# Patient Record
Sex: MALE | Race: BLACK OR AFRICAN AMERICAN | Employment: FULL TIME | URBAN - METROPOLITAN AREA
[De-identification: names, ages, dates, MRNs, and addresses within clinical notes are randomized per-mention and may not be internally consistent; named-entity substitution may affect disease eponyms.]

---

## 2019-03-09 ENCOUNTER — HOSPITAL ENCOUNTER (EMERGENCY)
Age: 59
Discharge: HOME | End: 2019-03-09
Attending: EMERGENCY MEDICINE | Admitting: EMERGENCY MEDICINE
Payer: COMMERCIAL

## 2019-03-09 VITALS
HEART RATE: 71 BPM | WEIGHT: 150.8 LBS | DIASTOLIC BLOOD PRESSURE: 76 MMHG | SYSTOLIC BLOOD PRESSURE: 142 MMHG | RESPIRATION RATE: 16 BRPM | TEMPERATURE: 98.4 F | OXYGEN SATURATION: 100 %

## 2019-03-09 DIAGNOSIS — L98.9 SKIN LESION: Primary | ICD-10-CM

## 2019-03-09 PROCEDURE — 99282 EMERGENCY DEPT VISIT SF MDM: CPT

## 2019-03-09 NOTE — ED PROVIDER NOTES
EMERGENCY DEPARTMENT HISTORY AND PHYSICAL EXAM    2:23 PM      Date: 3/9/2019  Patient Name: Jose Workman    History of Presenting Illness     Chief Complaint   Patient presents with    Leg Pain       History Provided By: Patient    Chief Complaint: Skin problem  Duration:  A year  Timing:  Constant  Location: Left leg, posterior portion of calf  Quality: Itchy  Severity: Not obtained at this time   Modifying Factors: when he squeezes it, it gets hard  Associated Symptoms: denies any other associated signs or symptoms      Additional History (Context): Jose Workman is a 66 y.o. male with no pertinent medical history of  who presents to the emergency department for evaluation of a constant, itchy left posterior calf skin problem onset a year ago. The pt indicated that when he \"squeezes it, it gets hard\". No calf swelling, erythema, or calf pain. Pt has not tried anything for his symptoms. Pt denies any fevers or chills, headache, dizziness or light headedness, ENT issues, CP or discomfort, SOB, cough, n/v/d/c, abd pain, back pain, diaphoresis, melena/hematochezia, dysuria, hematuria, frequency, focal weakness/numbness/tingling, or rash. Patient has no other complaints at this time. PCP:  None            Past History     Past Medical History:  No past medical history on file. Past Surgical History:  No past surgical history on file. Family History:  No family history on file. Social History:  Social History     Tobacco Use    Smoking status: Not on file   Substance Use Topics    Alcohol use: Not on file    Drug use: Not on file       Allergies:  No Known Allergies      Review of Systems     Review of Systems   Constitutional: Negative for chills and fever. HENT: Negative for congestion, rhinorrhea and sore throat. Eyes: Negative for visual disturbance. Respiratory: Negative for cough and shortness of breath. Cardiovascular: Negative for chest pain and palpitations. Gastrointestinal: Negative for abdominal pain, nausea and vomiting. Musculoskeletal: Negative for back pain and myalgias. Skin: Negative. Skin problem on the left posterior calf    Allergic/Immunologic: Negative. Neurological: Negative for headaches. All other systems reviewed and are negative. Physical Exam     Visit Vitals  /76 (BP 1 Location: Left arm, BP Patient Position: At rest)   Pulse 71   Temp 98.4 °F (36.9 °C)   Resp 16   Wt 68.4 kg (150 lb 12.8 oz)   SpO2 100%     Physical Exam   Constitutional: He is oriented to person, place, and time. He appears well-developed and well-nourished. HENT:   Head: Normocephalic and atraumatic. Eyes: Conjunctivae are normal.   Neck: Neck supple. Cardiovascular: Normal rate, regular rhythm and normal heart sounds. Exam reveals no gallop and no friction rub. No murmur heard. Pulmonary/Chest: Effort normal and breath sounds normal. No respiratory distress. He has no wheezes. He has no rales. Neurological: He is alert and oriented to person, place, and time. He exhibits normal muscle tone. Skin: Skin is warm and dry. No rash noted. No erythema. No pallor. Left posterior superior lower leg with 1x1.5cm hyperpigmented plaque with rubbery subcutaneous involvement, nontender on examination. Psychiatric: He has a normal mood and affect. His behavior is normal. Judgment and thought content normal.   Nursing note and vitals reviewed. Diagnostic Study Results     Labs -  No results found for this or any previous visit (from the past 12 hour(s)). Radiologic Studies -   No results found. Medical Decision Making   I am the first provider for this patient. I reviewed the vital signs, available nursing notes, past medical history, past surgical history, family history and social history. Vital Signs-Reviewed the patient's vital signs.     Pulse Oximetry Analysis -  100% on room air (Interpretation) WNL     Cardiac Monitor:  Rate: 71 bpm     Records Reviewed: Old Medical Records (Time of Review: 2:23 PM)    ED Course: Progress Notes, Reevaluation, and Consults:      Provider Notes (Medical Decision Making): Pt was concerned it was a problem with his vein. This is not a DVT. Instructions for him to f/u with PCP AND dermatology as he may need a biopsy. Pt declined any topical steroids for itching and will f/u as directed. Diagnosis     Clinical Impression:   1. Skin lesion        Disposition: D/C    Follow-up Information     Follow up With Specialties Details Why Contact Info    Real Anthony MD Emergency Medicine In 3 days  [address] [place] [address] [phone]      [facility]  In 3 days  [address] [id number] [phone]    [facility] EMERGENCY DEPT Emergency Medicine  If symptoms worsen [id number]  [phone]              Medication List      You have not been prescribed any medications. Scribe Attestation     Raffy Martinez acting as a scribe for and in the presence of Greta Nieto PA-C      March 09, 2019 at 2:24 PM       Provider Attestation:      I personally performed the services described in the documentation, reviewed the documentation, as recorded by the scribe in my presence, and it accurately and completely records my words and actions.  March 09, 2019 at 2:24 PM - Greta Nieto PA-C    _______________________________    SONDRA León

## 2019-03-09 NOTE — ED TRIAGE NOTES
\"I have a knot on the back of my leg that is hurting.   Its been there for a year but now it looks like its spreading\"

## 2021-04-15 ENCOUNTER — HOSPITAL ENCOUNTER (EMERGENCY)
Age: 61
Discharge: HOME | End: 2021-04-16
Attending: EMERGENCY MEDICINE
Payer: COMMERCIAL

## 2021-04-15 VITALS
SYSTOLIC BLOOD PRESSURE: 140 MMHG | HEART RATE: 65 BPM | DIASTOLIC BLOOD PRESSURE: 81 MMHG | OXYGEN SATURATION: 100 % | HEIGHT: 69 IN | RESPIRATION RATE: 18 BRPM | TEMPERATURE: 97.9 F | WEIGHT: 150 LBS | BODY MASS INDEX: 22.22 KG/M2

## 2021-04-15 DIAGNOSIS — H61.23 BILATERAL IMPACTED CERUMEN: Primary | ICD-10-CM

## 2021-04-15 PROCEDURE — 99281 EMR DPT VST MAYX REQ PHY/QHP: CPT

## 2021-04-16 NOTE — DISCHARGE INSTRUCTIONS
[employer] Activation    Thank you for requesting access to [employer]. Please follow the instructions below to securely access and download your online medical record. [employer] allows you to send messages to your doctor, view your test results, renew your prescriptions, schedule appointments, and more. How Do I Sign Up? In your internet browser, go to [email or web address]  Click on the First Time User? Click Here link in the Sign In box. You will be redirect to the New Member Sign Up page. Enter your [employer] Access Code exactly as it appears below. You will not need to use this code after youve completed the sign-up process. If you do not sign up before the expiration date, you must request a new code. [employer] Access Code: [unfilled] (This is the date your [employer] access code will )    Enter the last four digits of your Social Security Number (xxxx) and Date of Birth (mm/dd/yyyy) as indicated and click Submit. You will be taken to the next sign-up page. Create a [employer] ID. This will be your [employer] login ID and cannot be changed, so think of one that is secure and easy to remember. Create a [employer] password. You can change your password at any time. Enter your Password Reset Question and Answer. This can be used at a later time if you forget your password. Enter your e-mail address. You will receive e-mail notification when new information is available in [address] Click Sign Up. You can now view and download portions of your medical record. Click the [place] link to download a portable copy of your medical information. Additional Information    If you have questions, please visit the Frequently Asked Questions section of the [employer] website at [email or web address] Remember, [employer] is NOT to be used for urgent needs. For medical emergencies, dial 911.

## 2021-04-16 NOTE — ED TRIAGE NOTES
Pt states that he feels like something is moving around in his ears, left worse than right. Pain and swelling feeling. Denies any drainage. Decreased hearing to left ear.  Denies trauma

## 2021-04-16 NOTE — ED PROVIDER NOTES
EMERGENCY DEPARTMENT HISTORY AND PHYSICAL EXAM    Date: 4/15/2021  Patient Name: Genesis Lynn    History of Presenting Illness     Chief Complaint   Patient presents with    Ear Pain         History Provided By: Patient    Chief Complaint: Left Ear Pain  Duration: 2 Weeks  Timing:  Constant and Worsening  Location: Inner Left Ear  Quality: Fullness, Feels like something is moving  Severity: 10 out of 10  Modifying Factors: None  Associated Symptoms: denies any other associated signs or symptoms      Additional History (Context): Genesis Lynn is a 64 y.o. male who presents with left, inner ear pain that has been going on for about two weeks. The patient said the pain started as mild and has gradually increased. The pain is constant, he also described a fullness feeling and like there was something moving inside his ear. The patient denied any Q-tip trauma or any kind of trauma to his ear or any insects being in his ear to his knowledge. The patient said there was no radiation of the pain to his head, face, neck, or anywhere else. He rated the pain a 10 on the pain scale. The patient had not tried any home remedies to clean his ear. No other complaints reported. PCP: Unknown, Provider        Past History     Past Medical History:  No past medical history on file. Past Surgical History:  No past surgical history on file. Family History:  No family history on file. Social History:  Social History     Tobacco Use    Smoking status: Not on file   Substance Use Topics    Alcohol use: Not on file    Drug use: Not on file       Allergies:  No Known Allergies      Review of Systems   Review of Systems   Constitutional: Negative for chills, fatigue and fever. HENT: Positive for ear pain (Left Side - pre auricular and inner ear). Negative for ear discharge, hearing loss, sinus pressure, sinus pain, sore throat, tinnitus and trouble swallowing. Facial swelling: Left side, pre-auricular. Eyes: Negative for visual disturbance. Respiratory: Negative for shortness of breath. Cardiovascular: Negative for chest pain. Gastrointestinal: Negative for abdominal pain, nausea and vomiting. Genitourinary: Negative for difficulty urinating. Musculoskeletal: Negative for joint swelling. Neurological: Negative for dizziness, numbness and headaches. All other systems reviewed and are negative. All Other Systems Negative  Physical Exam     Vitals:    04/15/21 2147   BP: (!) 140/81   Pulse: 65   Resp: 18   Temp: 97.9 °F (36.6 °C)   SpO2: 100%   Weight: 68 kg (150 lb)   Height: 5' 9\" (1.753 m)     Physical Exam  Vitals signs reviewed. Constitutional:       General: He is awake. He is not in acute distress. Appearance: Normal appearance. HENT:      Head: Normocephalic. Left Ear: Hearing and external ear normal. No laceration, drainage, swelling or tenderness. There is impacted cerumen (visible cerumen and ear hair, cannot see the TM). No mastoid tenderness. Ears:      Comments: L ear: cerumen impaction noted, R ear: increased cerumen without evidence of impaction     Nose: Nose normal.   Eyes:      Pupils: Pupils are equal, round, and reactive to light. Neck:      Musculoskeletal: Normal range of motion. Cardiovascular:      Rate and Rhythm: Normal rate and regular rhythm. Pulses: Normal pulses. Heart sounds: Normal heart sounds. Pulmonary:      Effort: Pulmonary effort is normal.      Breath sounds: Normal breath sounds. Lymphadenopathy:      Head:      Right side of head: No submental, submandibular, tonsillar, preauricular, posterior auricular or occipital adenopathy. Left side of head: No submental, submandibular, tonsillar, preauricular, posterior auricular or occipital adenopathy. Cervical: No cervical adenopathy. Right cervical: No superficial or posterior cervical adenopathy.      Left cervical: No superficial or posterior cervical adenopathy. Upper Body:      Right upper body: No supraclavicular adenopathy. Left upper body: No supraclavicular adenopathy. Skin:     General: Skin is warm and dry. Neurological:      General: No focal deficit present. Mental Status: He is alert and oriented to person, place, and time. Comments: Gait is steady and patient exhibits no evidence of ataxia. Patient is able to ambulate without difficulty. No focal neurological deficit noted. No facial droop, slurred speech, or evidence of altered mentation noted on exam.     Psychiatric:         Attention and Perception: Attention normal.         Mood and Affect: Mood normal.         Speech: Speech normal.         Behavior: Behavior normal. Behavior is cooperative. Thought Content: Thought content normal.         Cognition and Memory: Cognition normal.         Judgment: Judgment normal.                Diagnostic Study Results     Labs -   No results found for this or any previous visit (from the past 12 hour(s)). Radiologic Studies -   No orders to display     CT Results  (Last 48 hours)    None        CXR Results  (Last 48 hours)    None            Medical Decision Making   I am the first provider for this patient. I reviewed the vital signs, available nursing notes, past medical history, past surgical history, family history and social history. Vital Signs-Reviewed the patient's vital signs. Records Reviewed: Keysha Fonesca PA-C     Procedures:  Procedures    Provider Notes (Medical Decision Making): Impression:  Cerumen impaction    Ear irrigated in the ED, sx improving. Will plan to d/c with debrox drops and pcp follow-up. Pt agrees. Keysha Fonseca PA-C     MED RECONCILIATION:  No current facility-administered medications for this encounter. Current Outpatient Medications   Medication Sig    carbamide peroxide (Debrox) 6.5 % otic solution Administer 5 Drops into each ear two (2) times a day. Disposition:  D/c    DISCHARGE NOTE:   Patient is stable for discharge at this time. I have discussed all the findings from today's work up with the patient, including lab results and imaging. I have answered all questions. Rx for debrox given. Rest and close follow-up with the PCP recommended this week. Return to the ED immediately for any new or worsening symptoms. April Vincenzo Varner PA-C     Follow-up Information     Follow up With Specialties Details Why Contact Info    Andrew Flores MD Otolaryngology, Surgery In 1 week  [place] [id number] [address]  [phone]      [facility] EMERGENCY DEPT Emergency Medicine  As needed, If symptoms worsen [address] [place] [id number]  [phone]          Discharge Medication List as of 4/16/2021 12:47 AM            Diagnosis     Clinical Impression:   1.  Bilateral impacted cerumen

## 2025-08-03 ENCOUNTER — HOSPITAL ENCOUNTER (EMERGENCY)
Facility: HOSPITAL | Age: 65
Discharge: HOME | End: 2025-08-03